# Patient Record
Sex: MALE | Race: BLACK OR AFRICAN AMERICAN | NOT HISPANIC OR LATINO | Employment: UNEMPLOYED | URBAN - METROPOLITAN AREA
[De-identification: names, ages, dates, MRNs, and addresses within clinical notes are randomized per-mention and may not be internally consistent; named-entity substitution may affect disease eponyms.]

---

## 2022-06-06 ENCOUNTER — OFFICE VISIT (OUTPATIENT)
Dept: FAMILY MEDICINE | Facility: CLINIC | Age: 32
End: 2022-06-06
Payer: MEDICAID

## 2022-06-06 VITALS
WEIGHT: 225.5 LBS | RESPIRATION RATE: 18 BRPM | SYSTOLIC BLOOD PRESSURE: 137 MMHG | DIASTOLIC BLOOD PRESSURE: 95 MMHG | TEMPERATURE: 99 F | OXYGEN SATURATION: 98 % | HEART RATE: 85 BPM

## 2022-06-06 DIAGNOSIS — F31.9 BIPOLAR AFFECTIVE DISORDER, REMISSION STATUS UNSPECIFIED: ICD-10-CM

## 2022-06-06 DIAGNOSIS — F20.9 SCHIZOPHRENIA, UNSPECIFIED TYPE: ICD-10-CM

## 2022-06-06 DIAGNOSIS — Z00.00 WELL ADULT EXAM: Primary | ICD-10-CM

## 2022-06-06 DIAGNOSIS — H61.23 BILATERAL IMPACTED CERUMEN: ICD-10-CM

## 2022-06-06 PROCEDURE — 3080F DIAST BP >= 90 MM HG: CPT | Mod: CPTII,,, | Performed by: FAMILY MEDICINE

## 2022-06-06 PROCEDURE — 99204 OFFICE O/P NEW MOD 45 MIN: CPT | Mod: PBBFAC | Performed by: FAMILY MEDICINE

## 2022-06-06 PROCEDURE — 1160F PR REVIEW ALL MEDS BY PRESCRIBER/CLIN PHARMACIST DOCUMENTED: ICD-10-PCS | Mod: CPTII,,, | Performed by: FAMILY MEDICINE

## 2022-06-06 PROCEDURE — 99385 PR PREVENTIVE VISIT,NEW,18-39: ICD-10-PCS | Mod: S$PBB,,, | Performed by: FAMILY MEDICINE

## 2022-06-06 PROCEDURE — 1159F MED LIST DOCD IN RCRD: CPT | Mod: CPTII,,, | Performed by: FAMILY MEDICINE

## 2022-06-06 PROCEDURE — 1159F PR MEDICATION LIST DOCUMENTED IN MEDICAL RECORD: ICD-10-PCS | Mod: CPTII,,, | Performed by: FAMILY MEDICINE

## 2022-06-06 PROCEDURE — 3075F PR MOST RECENT SYSTOLIC BLOOD PRESS GE 130-139MM HG: ICD-10-PCS | Mod: CPTII,,, | Performed by: FAMILY MEDICINE

## 2022-06-06 PROCEDURE — 3075F SYST BP GE 130 - 139MM HG: CPT | Mod: CPTII,,, | Performed by: FAMILY MEDICINE

## 2022-06-06 PROCEDURE — 1160F RVW MEDS BY RX/DR IN RCRD: CPT | Mod: CPTII,,, | Performed by: FAMILY MEDICINE

## 2022-06-06 PROCEDURE — 3080F PR MOST RECENT DIASTOLIC BLOOD PRESSURE >= 90 MM HG: ICD-10-PCS | Mod: CPTII,,, | Performed by: FAMILY MEDICINE

## 2022-06-06 PROCEDURE — 99385 PREV VISIT NEW AGE 18-39: CPT | Mod: S$PBB,,, | Performed by: FAMILY MEDICINE

## 2022-06-06 RX ORDER — HALOPERIDOL 5 MG/1
TABLET ORAL NIGHTLY
COMMUNITY
Start: 2022-04-20

## 2022-06-06 RX ORDER — CLONAZEPAM 0.5 MG/1
0.5 TABLET ORAL 2 TIMES DAILY PRN
COMMUNITY
Start: 2022-06-02

## 2022-06-06 RX ORDER — DIVALPROEX SODIUM 500 MG/1
500 TABLET, FILM COATED, EXTENDED RELEASE ORAL 2 TIMES DAILY PRN
COMMUNITY
Start: 2022-06-05

## 2022-06-06 RX ORDER — TRAZODONE HYDROCHLORIDE 100 MG/1
200 TABLET ORAL NIGHTLY PRN
COMMUNITY
Start: 2021-12-31

## 2022-06-06 RX ORDER — BENZTROPINE MESYLATE 1 MG/1
1 TABLET ORAL 2 TIMES DAILY
COMMUNITY
Start: 2022-06-02

## 2022-06-06 RX ORDER — RISPERIDONE 3 MG/1
3 TABLET ORAL
COMMUNITY
Start: 2022-04-20

## 2022-06-06 RX ORDER — MIRTAZAPINE 30 MG/1
30 TABLET, FILM COATED ORAL NIGHTLY
COMMUNITY
Start: 2022-05-22

## 2022-06-06 NOTE — PROGRESS NOTES
[facility]  [address] [address]  Phone: [phone]   Subjective:      Patient ID: Murray Cormier is a 31 y.o. male.    Chief Complaint: Establish Care (New patient, H/o bipolar disorder and schizophrenia, c/o coughing up mucus)    Problem List Items Addressed This Visit        Psychiatric    Schizophrenia    Overview     Pt had  visual hallucinations of people that have . He has not seen any hallucinations in 3 yrs.  Pt has no auditory hallucinations. Pt denies suicidal or homicidal ideations. Compliant with medications.           Bipolar disorder    Overview     Pt has bipolar d/o diagnosed at age 18 while in college. Pt has psychiatrist via telemedicine Dr. SNEHA Ramos, in [place], [place]. Pt is compliant with medications.              ENT    Bilateral impacted cerumen    Relevant Medications    carbamide peroxide (DEBROX) 6.5 % otic solution      Other Visit Diagnoses     Well adult exam    -  Primary    Relevant Orders    CBC Auto Differential    Comprehensive Metabolic Panel    TSH    Microalbumin/Creatinine Ratio, Urine    Hemoglobin A1C    Lipid Panel          The patient's Health Maintenance was reviewed and the following appears to be due:   Health Maintenance Due   Topic Date Due    Hepatitis C Screening  Never done    Lipid Panel  Never done    COVID-19 Vaccine (1) Never done    Pneumococcal Vaccines (Age 0-64) (1 - PCV) Never done    HIV Screening  Never done    TETANUS VACCINE  Never done       (PHQ-2 data if performed)  Depression Patient Health Questionnaire 2022   Over the last two weeks how often have you been bothered by little interest or pleasure in doing things 0   Over the last two weeks how often have you been bothered by feeling down, depressed or hopeless 0   PHQ-2 Total Score 0     (PHQ-9 data if performed)  No flowsheet data found.  (BHAVNA data if performed)  No flowsheet data found.     Past Medical  History:  Past Medical History:   Diagnosis Date    Bipolar disorder     Schizophrenia      History reviewed. No pertinent surgical history.  Review of patient's allergies indicates:  No Known Allergies  Current Outpatient Medications on File Prior to Visit   Medication Sig Dispense Refill    benztropine (COGENTIN) 1 MG tablet Take 1 mg by mouth 2 (two) times daily.      clonazePAM (KLONOPIN) 0.5 MG tablet Take 0.5 mg by mouth 2 (two) times daily as needed.      divalproex ER (DEPAKOTE) 500 MG Tb24 Take 500 mg by mouth 2 (two) times daily as needed.      haloperidoL (HALDOL) 5 MG tablet Take by mouth every evening.      mirtazapine (REMERON) 30 MG tablet Take 30 mg by mouth nightly.      risperiDONE (RISPERDAL) 3 MG Tab Take 3 mg by mouth. 1 tab in AM and 2 tabs at night      traZODone (DESYREL) 100 MG tablet Take 200 mg by mouth nightly as needed.       No current facility-administered medications on file prior to visit.     Social History     Socioeconomic History    Marital status: Single   Tobacco Use    Smoking status: Current Every Day Smoker    Smokeless tobacco: Never Used   Substance and Sexual Activity    Alcohol use: Yes     Family History   Problem Relation Age of Onset    Hypertension Mother     Diabetes Father     Hypertension Father        Review of Systems   Constitutional: Negative for chills and fever.   HENT: Negative for congestion, hearing loss, postnasal drip, rhinorrhea and sore throat.    Eyes: Negative for visual disturbance.   Respiratory: Negative for shortness of breath and wheezing.    Cardiovascular: Negative for chest pain, palpitations and leg swelling.   Gastrointestinal: Negative for abdominal pain, constipation, diarrhea, nausea and vomiting.   Endocrine: Negative for cold intolerance, heat intolerance, polydipsia, polyphagia and polyuria.   Genitourinary: Negative for decreased urine volume, dysuria, flank pain, hematuria and urgency.   Musculoskeletal: Negative  for arthralgias and myalgias.   Skin: Negative for rash.   Neurological: Negative for dizziness, weakness, numbness and headaches.   Hematological: Negative for adenopathy.   Psychiatric/Behavioral: Negative for behavioral problems, dysphoric mood and suicidal ideas. The patient is not nervous/anxious.    All other systems reviewed and are negative.      Objective:   BP (!) 137/95 (BP Location: Left arm, Patient Position: Sitting, BP Method: Large (Automatic))   Pulse 85   Temp 98.8 °F (37.1 °C) (Oral)   Resp 18   Wt 102.3 kg (225 lb 8.5 oz)   SpO2 98%   Physical Exam  Vitals and nursing note reviewed.   Constitutional:       General: He is not in acute distress.     Appearance: Normal appearance. He is normal weight. He is not ill-appearing.   HENT:      Head: Normocephalic and atraumatic.      Right Ear: Ear canal and external ear normal. There is impacted cerumen.      Left Ear: Ear canal and external ear normal. There is impacted cerumen.   Eyes:      Pupils: Pupils are equal, round, and reactive to light.   Cardiovascular:      Rate and Rhythm: Normal rate and regular rhythm.      Pulses: Normal pulses.      Heart sounds: Normal heart sounds. No murmur heard.    No friction rub. No gallop.   Pulmonary:      Effort: Pulmonary effort is normal.      Breath sounds: Normal breath sounds. No wheezing, rhonchi or rales.   Abdominal:      General: Abdomen is flat. Bowel sounds are normal.      Palpations: Abdomen is soft.      Tenderness: There is no abdominal tenderness.      Hernia: No hernia is present.   Musculoskeletal:         General: No swelling. Normal range of motion.      Cervical back: Normal range of motion and neck supple.   Skin:     General: Skin is warm and dry.      Capillary Refill: Capillary refill takes less than 2 seconds.      Findings: Lesion (subcutaneous piercing the size of a dime with consistency of a lipoma; non tender) present.   Neurological:      General: No focal deficit present.       Mental Status: He is alert and oriented to person, place, and time. Mental status is at baseline.      Cranial Nerves: No cranial nerve deficit.      Sensory: No sensory deficit.      Gait: Gait normal.   Psychiatric:         Mood and Affect: Mood normal.         Behavior: Behavior normal.         Thought Content: Thought content normal.         Judgment: Judgment normal.          No visits with results within 1 Month(s) from this visit.   Latest known visit with results is:   No results found for any previous visit.      Assessment:     1. Well adult exam    2. Bipolar affective disorder, remission status unspecified    3. Schizophrenia, unspecified type    4. Bilateral impacted cerumen      Plan:   I am having Murray Cormier maintain his mirtazapine, traZODone, clonazePAM, risperiDONE, divalproex ER, haloperidoL, benztropine, and carbamide peroxide. Pt started on debrox for cerumen impaction b/l. Will obtain medical record release for last 2 notes from psychiatrist.  Problem List Items Addressed This Visit        Psychiatric    Schizophrenia    Bipolar disorder  Continue current medications  Continue sessions telemedicine with psychiatrist, Dr. SNEHA Ramos, in [place], [place].   Patient denies suicidal or homicidal ideation, hallucinations, delusions, depression, mary kate and anxiety.       ENT    Bilateral impacted cerumen    Relevant Medications    carbamide peroxide (DEBROX) 6.5 % otic solution      Other Visit Diagnoses     Well adult exam    -  Primary    Relevant Orders    CBC Auto Differential    Comprehensive Metabolic Panel    TSH    Microalbumin/Creatinine Ratio, Urine    Hemoglobin A1C    Lipid Panel            Medication List with Changes/Refills   New Medications    CARBAMIDE PEROXIDE (DEBROX) 6.5 % OTIC SOLUTION    5 drops  In each ear x 10 days then 5 drops in each ear x10 days prior to next appointment.   Current Medications    BENZTROPINE (COGENTIN) 1 MG TABLET    Take 1 mg by mouth 2 (two) times  daily.    CLONAZEPAM (KLONOPIN) 0.5 MG TABLET    Take 0.5 mg by mouth 2 (two) times daily as needed.    DIVALPROEX ER (DEPAKOTE) 500 MG TB24    Take 500 mg by mouth 2 (two) times daily as needed.    HALOPERIDOL (HALDOL) 5 MG TABLET    Take by mouth every evening.    MIRTAZAPINE (REMERON) 30 MG TABLET    Take 30 mg by mouth nightly.    RISPERIDONE (RISPERDAL) 3 MG TAB    Take 3 mg by mouth. 1 tab in AM and 2 tabs at night    TRAZODONE (DESYREL) 100 MG TABLET    Take 200 mg by mouth nightly as needed.            Orders Placed This Encounter   Procedures    CBC Auto Differential     Standing Status:   Future     Standing Expiration Date:   8/5/2023    Comprehensive Metabolic Panel     Standing Status:   Future     Standing Expiration Date:   8/5/2023    TSH     Standing Status:   Future     Standing Expiration Date:   8/5/2023    Microalbumin/Creatinine Ratio, Urine     Order Specific Question:   Specimen Source     Answer:   Urine    Hemoglobin A1C     Standing Status:   Future     Standing Expiration Date:   8/5/2023    Lipid Panel     Standing Status:   Future     Standing Expiration Date:   8/5/2023       Previous medical history/lab work/radiology reviewed and considered during medical management decisions.   Medication list reviewed and medication reconciliation performed.  Patient was provided  and care about his/her current diagnosis (es) and medications including risk/benefit and side effects/adverse events, over the counter medication uses/doses, home self-care and contact precautions,  and red flags and indications for when to seek immediate medical attention.   Patient was advised to continue compliance with current medication list and medical recommendations.  Recommended/ Advised continued compliance with recommended eating habits/ diets for medical conditions and exercise 150 minutes/ week (if possible) for medical condition (s).  Educational handouts and instructions on selected disease  management in AVS (After Visit Summary).  Continue following up with specialist: Dr. SNEHA Ramos, Psychiatrist  All of the patient's questions were answered to patient's satisfaction.   The patient was receptive, expressed verbal understanding and agreement the above plan.        Kenn Alejandra MD

## 2022-07-06 DIAGNOSIS — Z00.00 WELL ADULT EXAM: Primary | ICD-10-CM
